# Patient Record
Sex: FEMALE | Race: BLACK OR AFRICAN AMERICAN | NOT HISPANIC OR LATINO | URBAN - METROPOLITAN AREA
[De-identification: names, ages, dates, MRNs, and addresses within clinical notes are randomized per-mention and may not be internally consistent; named-entity substitution may affect disease eponyms.]

---

## 2021-09-09 ENCOUNTER — APPOINTMENT (OUTPATIENT)
Dept: URBAN - METROPOLITAN AREA SURGERY 18 | Age: 26
Setting detail: DERMATOLOGY
End: 2021-09-13

## 2021-09-09 VITALS — TEMPERATURE: 97.3 F

## 2021-09-09 DIAGNOSIS — L65.9 NONSCARRING HAIR LOSS, UNSPECIFIED: ICD-10-CM

## 2021-09-09 DIAGNOSIS — L30.9 DERMATITIS, UNSPECIFIED: ICD-10-CM

## 2021-09-09 PROCEDURE — 99204 OFFICE O/P NEW MOD 45 MIN: CPT

## 2021-09-09 PROCEDURE — OTHER MEDICATION COUNSELING: OTHER

## 2021-09-09 PROCEDURE — OTHER PRESCRIPTION: OTHER

## 2021-09-09 PROCEDURE — OTHER DIAGNOSIS COMMENT: OTHER

## 2021-09-09 PROCEDURE — OTHER COUNSELING: OTHER

## 2021-09-09 PROCEDURE — OTHER ORDER TESTS: OTHER

## 2021-09-09 RX ORDER — DOXYCYCLINE HYCLATE 100 MG/1
CAPSULE, GELATIN COATED ORAL BID
Qty: 28 | Refills: 0 | Status: ERX | COMMUNITY
Start: 2021-09-09

## 2021-09-09 NOTE — PROCEDURE: DIAGNOSIS COMMENT
Detail Level: Simple
Render Risk Assessment In Note?: no
Comment: Unclear etiology. Hair loss with sinus tract on crown of scalp.  Possibly dissecting cellulitos with purulent drainage and sinus tract.  Present for 2 months.  Afebrile.  Asymptomatic.  \"Drained by PCP\" recently, no records for review.  Bacterial culture today.  Start doxycycline (pt denies pregnancy or breast feeding).  Get records for review.  F/u 2 weeks.  If she devlops fever, chills, n/v/d or it worsens she should seek immediate evaluation at ER. She v/u.
Comment: Associated with dermatitis, which is possibly dissecting cellulitis.  Will recheck in 2 weeks.

## 2021-09-09 NOTE — HPI: HAIR LOSS
How Did The Hair Loss Occur?: sudden in onset
How Severe Is Your Hair Loss?: moderate
Additional History: No treatment for hair loss.

## 2021-09-09 NOTE — PROCEDURE: MEDICATION COUNSELING
Xelrayaz Pregnancy And Lactation Text: This medication is Pregnancy Category D and is not considered safe during pregnancy.  The risk during breast feeding is also uncertain.

## 2021-09-09 NOTE — HPI: SKIN LESION
What Type Of Note Output Would You Prefer (Optional)?: Standard Output
How Severe Is Your Skin Lesion?: moderate
Has Your Skin Lesion Been Treated?: not been treated
Is This A New Presentation, Or A Follow-Up?: Skin Lesion
Additional History: Patient states the area was previously drained, but continues to fill back up with pus. Patient states she has also started to have hair loss. She has been seen by urgent care and has also had to stay overnight for two days at the hospital. Patient was given a steroid shot at Urgent Care. Patient states she was also given IV antibiotics at her stay at the hospital. The area is still draining. Will request records for review.

## 2021-09-09 NOTE — PROCEDURE: MEDICATION COUNSELING
[Earache] : earache [Joint Pain] : joint pain [Joint Swelling] : joint swelling [Negative] : Psychiatric [Skin Rash] : no skin rash Bexarotene Pregnancy And Lactation Text: This medication is Pregnancy Category X and should not be given to women who are pregnant or may become pregnant. This medication should not be used if you are breast feeding.
